# Patient Record
(demographics unavailable — no encounter records)

---

## 2024-11-01 NOTE — PHYSICAL EXAM
[Chaperone Present] : A chaperone was present in the examining room during all aspects of the physical examination [[id number]] : A chaperone was present during the pelvic exam. [No Acute Distress] : in no acute distress [Well developed] : well developed [Well Nourished] : ~L well nourished [Good Hygeine] : demonstrates good hygeine [Oriented x3] : oriented to person, place, and time [Normal Memory] : ~T memory was ~L unimpaired [Normal Mood/Affect] : mood and affect are normal [Warm and Dry] : was warm and dry to touch [Normal Gait] : gait was normal [Vulvar Atrophy] : vulvar atrophy [Atrophy] : atrophy [Erythematous] : erythema [Discharge] : a  ~M vaginal discharge was present [Scant] : scant [White] : white [Thin] : thin [No Bleeding] : there was no active vaginal bleeding [Normal] : normal [Labia Majora Erythema] : erythema [Labia Minora Erythema] : erythema [FreeTextEntry2] : TONIA Johns [Anxiety] : patient is not anxious [de-identified] : vulvar erythema, no signs or symptoms of infection noted

## 2024-11-01 NOTE — PROCEDURE
[Good Fit] : fits well [Erythema] : erythema noted [Discharge] : there is vaginal discharge [Pessary Inserted] : inserted [Pessary Washed] : washed [None] : no bleeding [Medication Review] : Medicaiton Review: Patient verbalizes understanding of risks and benefits [Refit] : refit is not needed [Erosion] : no evidence of erosion [Infection] : no evidence of infection [FreeTextEntry1] : Laine PALENCIA # 2 3/4 [de-identified] : posterior vaginal vault  [FreeTextEntry3] : Replens/Corrinea [FreeTextEntry8] : routine viji-care

## 2024-11-01 NOTE — DISCUSSION/SUMMARY
[FreeTextEntry1] : #Pelvic prolapse: -Continue with Gellhorn LS # 2 3/4. -Precaution and instructions were reviewed. -Recommended use OTC Replens/Luvena twice a week at bedside.   RTO in 3 months for pessary care or sooner if needed. All questions answered to the patient's satisfaction.  She expressed understanding. She knows to contact the office with any questions or concerns.

## 2024-11-01 NOTE — HISTORY OF PRESENT ILLNESS
[FreeTextEntry1] : Suzanne is an 84 y/o with hx of POP supported with Gellhorn LS # 2 3/4. She is happy with the support provided by the pessary. No new urinary symptoms.  No vaginal bleeding.  She does not use any vaginal lubrication.

## 2025-02-04 NOTE — PROCEDURE
[Good Fit] : fits well [Refit] : refit is not needed [Erosion] : no evidence of erosion [Erythema] : erythema noted [Discharge] : there is vaginal discharge [Infection] : no evidence of infection [Pessary Inserted] : inserted [Pessary Washed] : washed [None] : no bleeding [Medication Review] : Medicaiton Review: Patient verbalizes understanding of risks and benefits [FreeTextEntry1] : SHAWN LS # 2 3/4 [de-identified] : posterior vaginal vault  [FreeTextEntry3] : Replens/Corrinea [FreeTextEntry8] : routine viji-care

## 2025-02-04 NOTE — PHYSICAL EXAM
[Chaperone Present] : A chaperone was present in the examining room during all aspects of the physical examination [[id number]] : A chaperone was present during the pelvic exam. [FreeTextEntry2] : Shade, MA [No Acute Distress] : in no acute distress [Well developed] : well developed [Well Nourished] : ~L well nourished [Good Hygeine] : demonstrates good hygeine [Oriented x3] : oriented to person, place, and time [Normal Memory] : ~T memory was ~L unimpaired [Normal Mood/Affect] : mood and affect are normal [Anxiety] : patient is not anxious [Warm and Dry] : was warm and dry to touch [Normal Gait] : gait was normal [Vulvar Atrophy] : vulvar atrophy [Labia Majora Erythema] : erythema [Labia Minora Erythema] : erythema [Atrophy] : atrophy [Erythematous] : erythema [Discharge] : a  ~M vaginal discharge was present [Scant] : scant [White] : white [Thin] : thin [No Bleeding] : there was no active vaginal bleeding [Normal] : normal [de-identified] : vulvar erythema, no signs or symptoms of infection noted.

## 2025-02-04 NOTE — DISCUSSION/SUMMARY
[FreeTextEntry1] : #Pelvic prolapse: -Continue with GH LS # 2 3/4. -Precaution and instructions were reviewed. -Recommended use OTC Replens/Luvena twice a week at bedside.   RTO in 3 months for pessary care or sooner if needed. All questions answered to the patient's satisfaction.  She expressed understanding. She knows to contact the office with any questions or concerns.

## 2025-02-04 NOTE — PHYSICAL EXAM
[Chaperone Present] : A chaperone was present in the examining room during all aspects of the physical examination [[id number]] : A chaperone was present during the pelvic exam. [FreeTextEntry2] : Shade, MA [No Acute Distress] : in no acute distress [Well developed] : well developed [Well Nourished] : ~L well nourished [Good Hygeine] : demonstrates good hygeine [Oriented x3] : oriented to person, place, and time [Normal Memory] : ~T memory was ~L unimpaired [Normal Mood/Affect] : mood and affect are normal [Anxiety] : patient is not anxious [Warm and Dry] : was warm and dry to touch [Normal Gait] : gait was normal [Vulvar Atrophy] : vulvar atrophy [Labia Majora Erythema] : erythema [Labia Minora Erythema] : erythema [Atrophy] : atrophy [Erythematous] : erythema [Discharge] : a  ~M vaginal discharge was present [Scant] : scant [White] : white [Thin] : thin [No Bleeding] : there was no active vaginal bleeding [Normal] : normal [de-identified] : vulvar erythema, no signs or symptoms of infection noted.

## 2025-02-04 NOTE — PROCEDURE
[Good Fit] : fits well [Refit] : refit is not needed [Erosion] : no evidence of erosion [Erythema] : erythema noted [Discharge] : there is vaginal discharge [Infection] : no evidence of infection [Pessary Inserted] : inserted [Pessary Washed] : washed [None] : no bleeding [Medication Review] : Medicaiton Review: Patient verbalizes understanding of risks and benefits [FreeTextEntry1] : SHAWN LS # 2 3/4 [de-identified] : posterior vaginal vault  [FreeTextEntry3] : Replens/Corrinea [FreeTextEntry8] : routine viji-care

## 2025-02-04 NOTE — HISTORY OF PRESENT ILLNESS
[FreeTextEntry1] : Suzanne is an 86 y/o with hx of POP supported with GH LS # 2 3/4. She is happy with the support provided by the pessary. No new urinary symptoms. No vaginal bleeding. She does not use any vaginal lubrication.

## 2025-05-06 NOTE — PHYSICAL EXAM
[MA] : MA [No Acute Distress] : in no acute distress [Well developed] : well developed [Well Nourished] : ~L well nourished [Good Hygeine] : demonstrates good hygeine [Oriented x3] : oriented to person, place, and time [Normal Memory] : ~T memory was ~L unimpaired [Normal Mood/Affect] : mood and affect are normal [Warm and Dry] : was warm and dry to touch [Normal Gait] : gait was normal [Vulvar Atrophy] : vulvar atrophy [Labia Majora Erythema] : erythema [Labia Minora Erythema] : erythema [Atrophy] : atrophy [Erythematous] : erythema [Discharge] : a  ~M vaginal discharge was present [Scant] : scant [White] : white [Thin] : thin [No Bleeding] : there was no active vaginal bleeding [Normal] : normal [FreeTextEntry2] : Karla [Anxiety] : patient is not anxious [de-identified] : vulvar erythema, no signs or symptoms of infection noted.

## 2025-05-06 NOTE — HISTORY OF PRESENT ILLNESS
[FreeTextEntry1] : Suzanne is an 85 y/o with hx of POP supported with  LS # 2 3/4. She is happy with the support provided by the pessary. She denies vaginal bleeding or pain. No new urinary symptoms. No issues moving her bowels. She does not use any vaginal lubrication.

## 2025-05-06 NOTE — PROCEDURE
[Good Fit] : fits well [Erythema] : erythema noted [Discharge] : there is vaginal discharge [Pessary Inserted] : inserted [Pessary Washed] : washed [None] : no bleeding [Medication Review] : Medicaiton Review: Patient verbalizes understanding of risks and benefits [Refit] : refit is not needed [Erosion] : no evidence of erosion [Infection] : no evidence of infection [FreeTextEntry1] : SHAWN LS # 2 3/4 [de-identified] : posterior vaginal vault  [FreeTextEntry3] : Replens/Corrinea [FreeTextEntry8] : routine viji-care

## 2025-05-06 NOTE — DISCUSSION/SUMMARY
[FreeTextEntry1] : #POP: -Continue with GH LS # 2 3/4. -Precaution and instructions were reviewed. -Recommended use OTC Replens/Luvena 2-3 times a week at bedside.   RTO in 3 months for pessary care or sooner if needed. All questions answered to the patient's satisfaction.  She expressed understanding. She knows to contact the office with any questions or concerns.